# Patient Record
Sex: MALE | Race: WHITE
[De-identification: names, ages, dates, MRNs, and addresses within clinical notes are randomized per-mention and may not be internally consistent; named-entity substitution may affect disease eponyms.]

---

## 2019-08-04 ENCOUNTER — HOSPITAL ENCOUNTER (EMERGENCY)
Dept: HOSPITAL 53 - M ED | Age: 84
Discharge: HOME | End: 2019-08-04
Payer: MEDICARE

## 2019-08-04 VITALS — DIASTOLIC BLOOD PRESSURE: 90 MMHG | SYSTOLIC BLOOD PRESSURE: 154 MMHG

## 2019-08-04 VITALS — BODY MASS INDEX: 26.65 KG/M2 | WEIGHT: 190.39 LBS | HEIGHT: 71 IN

## 2019-08-04 DIAGNOSIS — I80.9: Primary | ICD-10-CM

## 2019-08-04 DIAGNOSIS — E03.9: ICD-10-CM

## 2019-08-04 DIAGNOSIS — Z79.890: ICD-10-CM

## 2019-08-04 DIAGNOSIS — Z88.0: ICD-10-CM

## 2019-08-04 NOTE — REPVR
EXAM: 

US Duplex Left Lower Extremity Veins, Limited 



EXAM DATE/TIME: 

8/4/2019 1:17 AM 



CLINICAL HISTORY: 

83 years old, male; Pain; Leg, upper and leg, lower; Left; Additional info: 

Left calf tenderness, swelling 



TECHNIQUE: 

Imaging protocol: Real-time Duplex ultrasound of the Left Lower Extremity with 

2-D gray scale, color Doppler flow and spectral waveform analysis with image 

documentation. Limited exam focused on the left lower extremity veins. 



COMPARISON: 

No relevant prior studies available. 



FINDINGS: 

Left deep veins: Unremarkable. The common femoral, proximal profunda femoral, 

femoral and popliteal veins are patent without thrombus. Normal 

compressibility, augmentation response and Doppler waveforms. 

Left superficial veins: Unremarkable. Saphenofemoral junction is patent without 

thrombus. 



Soft tissues: Unremarkable. 



IMPRESSION: 

No sonographic evidence of deep vein thrombosis. 



Electronically signed by: Nicholas Inman On 08/04/2019  01:47:40 AM